# Patient Record
Sex: MALE | Race: WHITE | NOT HISPANIC OR LATINO | Employment: FULL TIME | URBAN - METROPOLITAN AREA
[De-identification: names, ages, dates, MRNs, and addresses within clinical notes are randomized per-mention and may not be internally consistent; named-entity substitution may affect disease eponyms.]

---

## 2022-09-27 PROBLEM — M94.261 CHONDROMALACIA OF RIGHT KNEE: Status: ACTIVE | Noted: 2022-09-27

## 2022-10-24 PROBLEM — S83.232A COMPLEX TEAR OF MEDIAL MENISCUS OF LEFT KNEE AS CURRENT INJURY, INITIAL ENCOUNTER: Status: ACTIVE | Noted: 2022-10-24

## 2023-12-06 ENCOUNTER — HOSPITAL ENCOUNTER (OUTPATIENT)
Dept: RADIOLOGY | Facility: MEDICAL CENTER | Age: 43
End: 2023-12-06
Attending: PHYSICIAN ASSISTANT
Payer: COMMERCIAL

## 2023-12-06 ENCOUNTER — OCCUPATIONAL MEDICINE (OUTPATIENT)
Dept: URGENT CARE | Facility: PHYSICIAN GROUP | Age: 43
End: 2023-12-06
Payer: COMMERCIAL

## 2023-12-06 VITALS
DIASTOLIC BLOOD PRESSURE: 70 MMHG | RESPIRATION RATE: 18 BRPM | BODY MASS INDEX: 28.1 KG/M2 | SYSTOLIC BLOOD PRESSURE: 124 MMHG | HEIGHT: 77 IN | WEIGHT: 238 LBS | HEART RATE: 74 BPM | TEMPERATURE: 97.9 F | OXYGEN SATURATION: 99 %

## 2023-12-06 DIAGNOSIS — S86.911A STRAIN OF RIGHT KNEE, INITIAL ENCOUNTER: ICD-10-CM

## 2023-12-06 PROCEDURE — 99203 OFFICE O/P NEW LOW 30 MIN: CPT | Mod: 29 | Performed by: PHYSICIAN ASSISTANT

## 2023-12-06 PROCEDURE — 3074F SYST BP LT 130 MM HG: CPT | Performed by: PHYSICIAN ASSISTANT

## 2023-12-06 PROCEDURE — 3078F DIAST BP <80 MM HG: CPT | Performed by: PHYSICIAN ASSISTANT

## 2023-12-06 PROCEDURE — 73562 X-RAY EXAM OF KNEE 3: CPT | Mod: RT

## 2023-12-06 NOTE — LETTER
"    EMPLOYEE’S CLAIM FOR COMPENSATION/ REPORT OF INITIAL TREATMENT  FORM C-4  PLEASE TYPE OR PRINT    EMPLOYEE’S CLAIM - PROVIDE ALL INFORMATION REQUESTED   First Name                    LUDA Mullen Last Name  Reese Birthdate                    1980                Sex  []M  []F Claim Number (Insurer’s Use Only)     Home Address  [address]  Age  43 y.o. Height  1.956 m (6' 5\") Weight  108 kg (238 lb) Social Security Number     [facility] Zip  [address] Telephone  [phone] (home)    Mailing Address  [address]  [place] Zip  [address] Primary Language Spoken  English    INSURER  Baron Chu   THIRD-PARTY   Baron Chu   Employee's Occupation (Job Title) When Injury or Occupational Disease Occurred      Employer's Name/Company Name  [employer]  Telephone  [phone]    Office Mail Address (Number and Street)  [address] [place] [address]     Date of Injury (if applicable) 12/5/2023               Hours Injury (if applicable)            am               pm Date Employer Notified  12/5/2023 Last Day of Work after Injury or Occupational Disease  12/5/2023 Supervisor to Whom Injury     Reported  Ronak Mason   Address or Location of Accident (if applicable)  Work [1]   What were you doing at the time of accident? (if applicable)  stepped into cab of package car    How did this injury or occupational disease occur? (Be specific and answer in detail. Use additional sheet if necessary)  stepped into, cab from cargo area, right leg slipped down from top step to bottom step (2 steps total) and was straight, jamming my knee   If you believe that you have an occupational disease, when did you first have knowledge of the disability and its relationship to your employment?  N/A Witnesses to the Accident (if applicable)  N/A      Nature of Injury or Occupational " Disease  Workers' Compensation  Part(s) of Body Injured or Affected  Knee (R) N/A N/A    I CERTIFY THAT THE ABOVE IS TRUE AND CORRECT TO T HE BEST OF MY KNOWLEDGE AND THAT I HAVE PROVIDED THIS INFORMATION IN ORDER TO OBTAIN THE BENEFITS OF [place], OR [place]).  I HEREBY AUTHORIZE ANY PHYSICIAN, CHIROPRACTOR, SURGEON, PRACTITIONER OR ANY OTHER PERSON, ANY HOSPITAL, INCLUDING [facility], ANY  MEDICAL SERVICE ORGANIZATION, ANY INSURANCE COMPANY, OR OTHER INSTITUTION OR ORGANIZATION TO RELEASE TO EACH OTHER, ANY MEDICAL OR OTHER INFORMATION, INCLUDING BENEFITS PAID OR PAYABLE, PERTINENT TO THIS INJURY OR DISEASE, EXCEPT INFORMATION RELATIVE TO DIAGNOSIS, TREATMENT AND/OR COUNSELING FOR AIDS, PSYCHOLOGICAL CONDITIONS, ALCOHOL OR CONTROLLED SUBSTANCES, FOR WHICH I MUST GIVE SPECIFIC AUTHORIZATION.  A PHOTOSTAT OF THIS AUTHORIZATION SHALL BE VALID AS THE ORIGINAL.     Date   Place Employee’s Original or  *Electronic Signature   THIS REPORT MUST BE COMPLETED AND MAILED WITHIN 3 WORKING DAYS OF TREATMENT   Place  [facility]    Name of Facility  [facility]   Date 12/6/2023 Diagnosis and Description of Injury or Occupational Disease  (S86.911A) Strain of right knee, initial encounter  The encounter diagnosis was Strain of right knee, initial encounter. Is there evidence that the injured employee was under the influence of alcohol and/or another controlled substance at the time of accident?  []No  [] Yes (if yes, please explain)   Hour 10:40 AM  No   Treatment: Evaluation with radiographs, use of over-the-counter anti-inflammatories ice and elevation.  Application of a hinged knee brace and appropriate use thereof    Have you advised the patient to remain off work five days or more?   [] Yes Indicate dates: From   To    []No If no, is the injured employee capable of: [] full duty []  modified duty                                                             No  Yes  If modified duty, specify any limitations / restrictions:  Light duty, see associated paperwork                                                                                                                                                                                                                                                                                                                                                                                                               X-Ray Findings:   Comments:IMPRESSION:   1.  There is a moderate joint effusion. 2.  Questionable transverse linear lucency in the lateral tibial plateau seen on only one view, possibly a nondisplaced tibial plateau fracture. If pain persists, CT or MRI may be of benefit.    From information given by the employee, together with medical evidence, can you directly connect this injury or occupational disease as job incurred?  []Yes   [] No Yes    Is additional medical care by a physician indicated? []Yes [] No  Yes    Do you know of any previous injury or disease contributing to this condition or occupational disease? []Yes [] No (Explain if yes)                          Yes  Comments:Potentially contributing as prior meniscus injury with meniscus surgery in early 2023   Date  12/6/2023 Print Health Care Provider’s Name  Ap Mathis P.A.-C. I certify that the employer’s copy of  this form was delivered to the employer on:   Address  [address] [place] [address]. INSURER'S USE ONLY                       [facility]  [id number] Provider’s Tax ID Number  [id number]   Telephone  Dept: [phone]    Health Care Provider’s Original or Electronic Signature  e-AP Pereira P.A.-C. Degree (MD,, DC,PAMaria DoloresC,APRN)  LORA  Choose (if applicable)      ORIGINAL - TREATING HEALTHCARE PROVIDER PAGE 2 - INSURER/TPA PAGE 3 - EMPLOYER PAGE 4 - EMPLOYEE      "        Form C-4 (rev.08/23)        BRIEF DESCRIPTION OF RIGHTS AND BENEFITS  (Pursuant to [place] 616[place].050)    Notice of Injury or Occupational Disease (Incident Report Form C-1): If an injury or occupational disease (OD) arises out of and in the course of employment, you must provide written notice to your employer as soon as practicable, but no later than 7 days after the accident or OD. Your employer shall maintain a sufficient supply of the required forms.    Claim for Compensation (Form C-4): If medical treatment is sought, the form C-4 is available at the place of initial treatment. A completed \"Claim for Compensation\" (Form C-4) must be filed within 90 days after an accident or OD. The treating physician or chiropractor must, within 3 working days after treatment, complete and mail to the employer, the employer's insurer and third-party , the Claim for Compensation.    Medical Treatment: If you require medical treatment for your on-the-job injury or OD, you may be required to select a physician or chiropractor from a list provided by your workers’ compensation insurer, if it has contracted with an Organization for Managed Care (MCO) or Preferred Provider Organization (PPO) or providers of health care. If your employer has not entered into a contract with an MCO or PPO, you may select a physician or chiropractor from the Panel of Physicians and Chiropractors. Any medical costs related to your industrial injury or OD will be paid by your insurer.    Temporary Total Disability (TTD): If your doctor has certified that you are unable to work for a period of at least 5 consecutive days, or 5 cumulative days in a 20-day period, or places restrictions on you that your employer does not accommodate, you may be entitled to TTD compensation.    Temporary Partial Disability (TPD): If the wage you receive upon reemployment is less than the compensation for TTD to which you are entitled, the insurer may be " required to pay you TPD compensation to make up the difference. TPD can only be paid for a maximum of 24 months.    Permanent Partial Disability (PPD): When your medical condition is stable and there is an indication of a PPD as a result of your injury or OD, within 30 days, your insurer must arrange for an evaluation by a rating physician or chiropractor to determine the degree of your PPD. The amount of your PPD award depends on the date of injury, the results of the PPD evaluation, your age and wage.    Permanent Total Disability (PTD): If you are medically certified by a treating physician or chiropractor as permanently and totally disabled and have been granted a PTD status by your insurer, you are entitled to receive monthly benefits not to exceed 66 2/3% of your average monthly wage. The amount of your PTD payments is subject to reduction if you previously received a lump-sum PPD award.    Vocational Rehabilitation Services: You may be eligible for vocational rehabilitation services if you are unable to return to the job due to a permanent physical impairment or permanent restrictions as a result of your injury or occupational disease.    Transportation and Per Jessica Reimbursement: You may be eligible for travel expenses and per jessica associated with medical treatment.    Reopening: You may be able to reopen your claim if your condition worsens after claim closure.     Appeal Process: If you disagree with a written determination issued by the insurer or the insurer does not respond to your request, you may appeal to the Department of Administration, , by following the instructions contained in your determination letter. You must appeal the determination within 70 days from the date of the determination letter at [address], [address], [place], [place] [address], or [address], [address], [place] [address]. If you disagree with the  decision, you may  appeal to the Department of Administration, . You must file your appeal within 30 days from the date of the  decision letter at [address], [address], [place], [place] [address], or [address], [place], [place] [address]. If you disagree with a decision of an , you may file a petition for judicial review with the District Court. You must do so within 30 days of the Appeal Officer’s decision. You may be represented by an  at your own expense or you may contact the [facility] for possible representation.    [place]  for Injured Workers (NAIW): If you disagree with a  decision, you may request that NAIW represent you without charge at an  Hearing. For information regarding denial of benefits, you may contact the [facility] at: [address], [address], [place] [address], [phone], or [address], [place] [address], [phone]    To File a Complaint with the Division: If you wish to file a complaint with the  of the Division of Industrial Relations (DIR),  please contact the Workers’ Compensation Section, [address], [address], [place], [place] [address], telephone [phone]) [phone], or [address], [place], [place] [address], telephone [phone].    For assistance with Workers’ Compensation Issues: You may contact the [facility] Office for Consumer Health Assistance, [address], [place] [address], Toll Free 1-172.567.1477, Web site: http://[facility].[place].gov/Programs/VANNESA E-mail: vannesa@[facility].[place].gov              __________________________________________________________________                                    _________________            Employee Name / Signature                                                                                                                            Date                                                                                                                                                                                                                               D-2 (rev. 10/20)

## 2023-12-06 NOTE — LETTER
[facility]  [address] [place] [address]  Phone:  [phone] - Fax:  [phone]   [facility] Progress Report and Disability Certification  Date of Service: 12/6/2023   No Show:  No  Date / Time of Next Visit: 12/8/2023   Claim Information   Patient Name: Sebas Leigh  Claim Number:     Employer: [employer]  Date of Injury: 12/5/2023     Insurer / TPA: [employer] [place]  ID / SSN:     Occupation:   Diagnosis: The encounter diagnosis was Strain of right knee, initial encounter.    Medical Information   Related to Industrial Injury? Yes    Subjective Complaints:  The remainder 12/5/2023: Patient describes that he was on steps leading from the cargo area of the van into the cab when he slipped causing his right foot missed 2 steps and landed with his knee straight.  He felt immediate pain, pain was worse over the lateral knee.  Today he is able to walk with no significant limp but notes his diffuse knee pain and swelling, pain is more prominent with shooting pain over the lateral aspect of the knee.  He has not noted any instability.  Prior history of meniscal tear in that knee with surgical repair earlier this year, reports that he was at a fully recovered state prior to his injury and is not noting any symptoms similar to what he had with his prior meniscus injury.  He denies any numbness or tingling.  He has no second job   Objective Findings: Alert nontoxic male no acute distress.  Effusion of the right knee with ballottement of the patella.  No joint line tenderness.  No laxity with varus and valgus stress, negative anterior posterior drawer.  Ambulatory with a steady station and gait, neurovascularly intact, no appreciated ligamentous instability   Pre-Existing Condition(s):     Assessment:   Initial Visit    Status: Additional Care Required  Permanent Disability:No    Plan:      Diagnostics: X-ray    Comments:  IMPRESSION:  1.  There is a  moderate joint effusion.  2.  Questionable transverse linear lucency in the lateral tibial plateau seen on only one view, possibly a nondisplaced tibial plateau fracture. If pain persists, CT or MRI may be of benefit.      Disability Information   Status: Released to Restricted Duty    From:  2023  Through: 2023 Restrictions are: Temporary   Physical Restrictions   Sitting:    Standing:  < or = to 1 hr/day Stooping:    Bendin hrs/day   Squattin hrs/day Walking:  < or = to 2 hrs/day Climbin hrs/day Pushin hrs/day   Pulling:    Other:    Reaching Above Shoulder (L):   Reaching Above Shoulder (R):       Reaching Below Shoulder (L):    Reaching Below Shoulder (R):      Not to exceed Weight Limits   Carrying(hrs):   Weight Limit(lb): < or = to 10 pounds Lifting(hrs):   Weight  Limit(lb): < or = to 10 pounds   Comments: Recommend light duty and activity as tolerated.  Patient may not be able to ascend and descend into his vehicle or lift heavy weight depending on his pain pattern.  Recommend he remain in the hinged knee brace, ice and elevate the knee regularly and return in 2 days for repeat evaluation.  If not showing improvement I would have a low threshold to refer him to orthopedics given his prior knee injury and history    Repetitive Actions   Hands: i.e. Fine Manipulations from Grasping:     Feet: i.e. Operating Foot Controls:     Driving / Operate Machinery:     Health Care Provider’s Original or Electronic Signature  Ap Mathis P.A.-C. Health Care Provider’s Original or Electronic Signature    Paresh Franco DO MPH     Clinic Name / Location: [facility] [address] [address] Clinic Phone Number: Dept: [phone]   Appointment Time: 10:05 Am Visit Start Time: 10:40 AM   Check-In Time:  10:31 Am Visit Discharge Time:  12:20 AM    Original-Treating Physician or Chiropractor    Page 2-Insurer/TPA    Page 3-Employer    Page 4-Employee

## 2023-12-06 NOTE — PROGRESS NOTES
"Subjective:     Sebas Leigh is a 43 y.o. male who presents for Knee Injury (New  Right knee)      The remainder 12/5/2023: Patient describes that he was on steps leading from the cargo area of the van into the cab when he slipped causing his right foot missed 2 steps and landed with his knee straight.  He felt immediate pain, pain was worse over the lateral knee.  Today he is able to walk with no significant limp but notes his diffuse knee pain and swelling, pain is more prominent with shooting pain over the lateral aspect of the knee.  He has not noted any instability.  Prior history of meniscal tear in that knee with surgical repair earlier this year, reports that he was at a fully recovered state prior to his injury and is not noting any symptoms similar to what he had with his prior meniscus injury.  He denies any numbness or tingling.  He has no second job    PMH:   No pertinent past medical history to this problem  MEDS:  Medications were reviewed in EMR  ALLERGIES:  Allergies were reviewed in EMR  SOCHX:  Works as a truck/  FH:   No pertinent family history to this problem       Objective:     /70   Pulse 74   Temp 36.6 °C (97.9 °F)   Resp 18   Ht 1.956 m (6' 5\")   Wt 108 kg (238 lb)   SpO2 99%   BMI 28.22 kg/m²     Alert nontoxic male no acute distress.  Effusion of the right knee with ballottement of the patella.  No joint line tenderness.  No laxity with varus and valgus stress, negative anterior posterior drawer.  Ambulatory with a steady station and gait, neurovascularly intact, no appreciated ligamentous instability    RADIOLOGY RESULTS   DX-KNEE 3 VIEWS RIGHT    Result Date: 12/6/2023 12/6/2023 11:17 AM HISTORY/REASON FOR EXAM:  Right lateral knee pain after fall yesterday. TECHNIQUE/EXAM DESCRIPTION AND NUMBER OF VIEWS:  3 views of the RIGHT knee. COMPARISON: None FINDINGS: There is a moderate joint effusion. There is no evidence of displaced  fracture or dislocation. " There is mild degenerative spurring in all 3 compartments. Questionable transverse linear lucency in the lateral tibial plateau on one view not confirmed on other views.     1.  There is a moderate joint effusion. 2.  Questionable transverse linear lucency in the lateral tibial plateau seen on only one view, possibly a nondisplaced tibial plateau fracture. If pain persists, CT or MRI may be of benefit.             Assessment/Plan:       1. Strain of right knee, initial encounter  - DX-KNEE 3 VIEWS Pain/Deformity Following Trauma; Future    Released to Restricted Duty FROM 12/6/2023 TO 12/11/2023  Recommend light duty and activity as tolerated.  Patient may not be able to ascend and descend into his vehicle or lift heavy weight depending on his pain pattern.  Recommend he remain in the hinged knee brace, ice and elevate the knee regularly and return in 5 days for repeat evaluation.  If not showing improvement I would have a low threshold to refer him to orthopedics given his prior knee injury and history       Differential diagnosis, natural history, supportive care, and indications for immediate follow-up discussed.

## 2023-12-08 ENCOUNTER — OCCUPATIONAL MEDICINE (OUTPATIENT)
Dept: URGENT CARE | Facility: PHYSICIAN GROUP | Age: 43
End: 2023-12-08
Payer: COMMERCIAL

## 2023-12-08 VITALS
DIASTOLIC BLOOD PRESSURE: 92 MMHG | HEIGHT: 77 IN | WEIGHT: 235 LBS | BODY MASS INDEX: 27.75 KG/M2 | OXYGEN SATURATION: 99 % | RESPIRATION RATE: 14 BRPM | SYSTOLIC BLOOD PRESSURE: 136 MMHG | HEART RATE: 69 BPM | TEMPERATURE: 98.5 F
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
## 2023-12-08 DIAGNOSIS — S86.911D STRAIN OF RIGHT KNEE, SUBSEQUENT ENCOUNTER: ICD-10-CM

## 2023-12-08 PROCEDURE — 3075F SYST BP GE 130 - 139MM HG: CPT

## 2023-12-08 PROCEDURE — 99214 OFFICE O/P EST MOD 30 MIN: CPT

## 2023-12-08 PROCEDURE — 3080F DIAST BP >= 90 MM HG: CPT

## 2023-12-08 NOTE — PROGRESS NOTES
"Subjective:     Sebas Leigh is a 43 y.o. male who presents for Follow-Up (W/C DOI 12.5.2023/Right knee, sharp pains x 1 day/Swelling/)      DOI: 12/5/2023: Patient describes that he was on steps leading from the cargo area of the van into the cab when he slipped causing his right foot missed 2 steps and landed with his knee straight.  He felt immediate pain, pain was worse over the lateral knee.  Today he is able to walk with no significant limp but notes his diffuse knee pain and swelling, pain is more prominent with shooting pain over the lateral aspect of the knee.  He has not noted any instability.  Prior history of meniscal tear in that knee with surgical repair earlier this year, reports that he was at a fully recovered state prior to his injury and is not noting any symptoms similar to what he had with his prior meniscus injury.  He denies any numbness or tingling.  He has no second job    FV#1 12/8/23.  Pt reports that the swelling has not improved and the pain is about the same.  He now has a sharp pain right below his knee cap that is intermittent.  He reports that it feel slightly unstable at times.  Sharp pain to lateral joint line with every step and when he tries to walk up and down stairs and if he stands for too long.  The swelling gets better if he stays off his leg but if he takes the brace off his knee just \"blows up\" with swelling    PMH:   No pertinent past medical history to this problem  MEDS:  Medications were reviewed in EMR  ALLERGIES:  Allergies were reviewed in EMR  FH:   No pertinent family history to this problem       Objective:     BP (!) 136/92   Pulse 69   Temp 36.9 °C (98.5 °F) (Temporal)   Resp 14   Ht 1.956 m (6' 5\")   Wt 107 kg (235 lb)   SpO2 99%   BMI 27.87 kg/m²     Gen: no acute distress, normal voice  Skin: dry, intact, moist mucosal membranes  Head: Atraumatic, normocephalic  Psych: normal affect, normal judgement, alert, awake  Musculoskeletal: Moderate amount of " swelling noted.  Pain along lateral joint line.  TTP to area just distal of knee cap.          Assessment/Plan:       1. Work related injury  - Referral to Orthopedics  - Referral to Occupational Medicine  - MR-KNEE-W/O RIGHT; Future  - Referral to Radiology    2. Strain of right knee, subsequent encounter  - Referral to Orthopedics  - Referral to Occupational Medicine  - MR-KNEE-W/O RIGHT; Future  - Referral to Radiology    Released to Restricted Duty FROM 12/8/2023 TO 12/15/2023  Recommend light duty and activity as tolerated. Recommend desk work.  Patient may not be able to ascend and descend into his vehicle   Recommend he remain in the hinged knee brace, ice and elevate the knee regularly.  Limit 10 lb, no stooping, squatting, climbing.  Can walk or stand for up to 1 hour per day.  Fu in 1 week with Occupation medicine for MRI results and further management. Referral placed   On original xray there was question of a possible nondisplaced tibial plateau fracture, pt is now having pain in that area, recommended further imaging.  MRI was placed due to pt's history of surgical repair and new pain.    Differential diagnosis, natural history, supportive care, and indications for immediate follow-up discussed.

## 2023-12-08 NOTE — LETTER
"   [facility]  [address]  [place] [address]  Phone:  [phone] - Fax:  [phone]   [facility] Progress Report and Disability Certification  Date of Service: 12/8/2023   No Show:  No  Date / Time of Next Visit: 12/18/2023   Claim Information   Patient Name: Sebas Leigh  Claim Number:     Employer: [employer]  Date of Injury: 12/5/2023     Insurer / TPA: Baron [place]  ID / SSN:     Occupation:   Diagnosis: Diagnoses of Work related injury and Strain of right knee, subsequent encounter were pertinent to this visit.    Medical Information   Related to Industrial Injury?      Subjective Complaints:  DOI: 12/5/2023: Patient describes that he was on steps leading from the cargo area of the van into the cab when he slipped causing his right foot missed 2 steps and landed with his knee straight.  He felt immediate pain, pain was worse over the lateral knee.  Today he is able to walk with no significant limp but notes his diffuse knee pain and swelling, pain is more prominent with shooting pain over the lateral aspect of the knee.  He has not noted any instability.  Prior history of meniscal tear in that knee with surgical repair earlier this year, reports that he was at a fully recovered state prior to his injury and is not noting any symptoms similar to what he had with his prior meniscus injury.  He denies any numbness or tingling.  He has no second job    FV#1 12/8/23.  Pt reports that the swelling has not improved and the pain is about the same.  He now has a sharp pain right below his knee cap that is intermittent.  He reports that it feel slightly unstable at times.  Sharp pain to lateral joint line with every step and when he tries to walk up and down stairs and if he stands for too long.  The swelling gets better if he stays off his leg but if he takes the brace off his knee just \"blows up\" with swelling   Objective Findings: Gen: no " acute distress, normal voice  Skin: dry, intact, moist mucosal membranes  Head: Atraumatic, normocephalic  Psych: normal affect, normal judgement, alert, awake  Musculoskeletal: Moderate amount of swelling noted.  Pain along lateral joint line.  TTP to area just distal of knee cap.         Pre-Existing Condition(s):     Assessment:   Condition Worsened    Status: Discharged / Care Transfer  Permanent Disability:     Plan:      Diagnostics: MRI    Comments:       Disability Information   Status: Released to Restricted Duty    From:  2023  Through: 2023 Restrictions are: Temporary   Physical Restrictions   Sitting:    Standing:  < or = to 1 hr/day Stoopin hrs/day Bending:      Squattin hrs/day Walking:  < or = to 1 hr/day Climbin hrs/day Pushing:      Pulling:    Other:    Reaching Above Shoulder (L):   Reaching Above Shoulder (R):       Reaching Below Shoulder (L):    Reaching Below Shoulder (R):      Not to exceed Weight Limits   Carrying(hrs):   Weight Limit(lb): < or = to 10 pounds Lifting(hrs):   Weight  Limit(lb):     Comments: Recommend desk work, pt must remain non-weight bearing and use crutches at all times.  Recommend he remain in the knee brace, ice and elevate the knee regularly.  Limit 10 lb, no stooping, squatting, climbing.  Limit standing and walking    Repetitive Actions   Hands: i.e. Fine Manipulations from Grasping:     Feet: i.e. Operating Foot Controls:     Driving / Operate Machinery:     Health Care Provider’s Original or Electronic Signature  BARBARA Motley Health Care Provider’s Original or Electronic Signature    Paresh Franco DO MPH     Clinic Name / Location: [facility] [address] [address] Clinic Phone Number: Dept: [phone]   Appointment Time: 11:00 Am Visit Start Time: 10:55 AM   Check-In Time:  10:46 Am Visit Discharge Time: 11:43 Am    Original-Treating Physician or Chiropractor    Page 2-Insurer/TPA    Page  3-Employer    Page 4-Employee

## 2023-12-08 NOTE — LETTER
"   [facility] [place]  [address]  [place] [address]  Phone:  [phone] - Fax:  [phone]   [facility] Progress Report and Disability Certification  Date of Service: 12/8/2023   No Show:  No  Date / Time of Next Visit: 12/15/2023   Claim Information   Patient Name: Sebas Leigh  Claim Number:     Employer: [employer]  Date of Injury: 12/5/2023     Insurer / TPA: Baron [place]  ID / SSN:     Occupation:   Diagnosis: Diagnoses of Work related injury and Strain of right knee, subsequent encounter were pertinent to this visit.    Medical Information   Related to Industrial Injury?      Subjective Complaints:  DOI: 12/5/2023: Patient describes that he was on steps leading from the cargo area of the van into the cab when he slipped causing his right foot missed 2 steps and landed with his knee straight.  He felt immediate pain, pain was worse over the lateral knee.  Today he is able to walk with no significant limp but notes his diffuse knee pain and swelling, pain is more prominent with shooting pain over the lateral aspect of the knee.  He has not noted any instability.  Prior history of meniscal tear in that knee with surgical repair earlier this year, reports that he was at a fully recovered state prior to his injury and is not noting any symptoms similar to what he had with his prior meniscus injury.  He denies any numbness or tingling.  He has no second job    FV#1 12/8/23.  Pt reports that the swelling has not improved and the pain is about the same.  He now has a sharp pain right below his knee cap that is intermittent.  He reports that it feel slightly unstable at times.  Sharp pain to lateral joint line with every step and when he tries to walk up and down stairs and if he stands for too long.  The swelling gets better if he stays off his leg but if he takes the brace off his knee just \"blows up\" with swelling   Objective Findings: Gen: no " acute distress, normal voice  Skin: dry, intact, moist mucosal membranes  Head: Atraumatic, normocephalic  Psych: normal affect, normal judgement, alert, awake  Musculoskeletal: Moderate amount of swelling noted.  Pain along lateral joint line.  TTP to area just distal of knee cap.         Pre-Existing Condition(s):     Assessment:   Condition Worsened    Status: Discharged / Care Transfer  Permanent Disability:     Plan:      Diagnostics: MRI    Comments:       Disability Information   Status: Released to Restricted Duty    From:  2023  Through: 12/15/2023 Restrictions are: Temporary   Physical Restrictions   Sitting:    Standing:  < or = to 1 hr/day Stoopin hrs/day Bending:      Squattin hrs/day Walking:  < or = to 1 hr/day Climbin hrs/day Pushing:      Pulling:    Other:    Reaching Above Shoulder (L):   Reaching Above Shoulder (R):       Reaching Below Shoulder (L):    Reaching Below Shoulder (R):      Not to exceed Weight Limits   Carrying(hrs):   Weight Limit(lb): < or = to 10 pounds Lifting(hrs):   Weight  Limit(lb):     Comments: Recommend light duty and activity as tolerated. Recommend desk work.  Patient may not be able to ascend and descend into his vehicle   Recommend he remain in the hinged knee brace, ice and elevate the knee regularly.  Limit 10 lb, no stooping, squatting, climbing.  Can walk or stand for up to 1 hour per day.  Fu in 1 week with Occupation medicine for MRI results and further management. Referral placed    Repetitive Actions   Hands: i.e. Fine Manipulations from Grasping:     Feet: i.e. Operating Foot Controls:     Driving / Operate Machinery:     Health Care Provider’s Original or Electronic Signature  BARBARA Motley Health Care Provider’s Original or Electronic Signature    Paresh Franco DO MPH     Clinic Name / Location: [facility] [address] [address] Clinic Phone Number: Dept: [phone]   Appointment Time: 11:00 Am  Visit Start Time: 10:55 AM   Check-In Time:  10:46 Am Visit Discharge Time:  11:45 AM   Original-Treating Physician or Chiropractor    Page 2-Insurer/TPA    Page 3-Employer    Page 4-Employee

## 2023-12-11 ENCOUNTER — APPOINTMENT (OUTPATIENT)
Dept: RADIOLOGY | Facility: MEDICAL CENTER | Age: 43
End: 2023-12-11
Payer: COMMERCIAL

## 2023-12-11 DIAGNOSIS — S82.101A CLOSED FRACTURE OF PROXIMAL END OF RIGHT TIBIA, UNSPECIFIED FRACTURE MORPHOLOGY, INITIAL ENCOUNTER: ICD-10-CM
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
## 2023-12-11 DIAGNOSIS — S86.911D STRAIN OF RIGHT KNEE, SUBSEQUENT ENCOUNTER: ICD-10-CM

## 2023-12-11 PROCEDURE — 73721 MRI JNT OF LWR EXTRE W/O DYE: CPT | Mod: RT

## 2023-12-11 NOTE — RESULT ENCOUNTER NOTE
MRI results show tibial fracture, recommend non-weight bearing.  Work restrictions changed to reflect this new dx, faxed to employer and emailed to pt.  Pt was half way back home to [facility] when results came in, fortunately  he has crutches at home he can use

## 2023-12-18 ENCOUNTER — OCCUPATIONAL MEDICINE (OUTPATIENT)
Dept: OCCUPATIONAL MEDICINE | Facility: CLINIC | Age: 43
End: 2023-12-18
Payer: COMMERCIAL

## 2023-12-18 VITALS
RESPIRATION RATE: 16 BRPM | HEIGHT: 77 IN | OXYGEN SATURATION: 97 % | TEMPERATURE: 99.5 F | HEART RATE: 80 BPM | WEIGHT: 235 LBS | DIASTOLIC BLOOD PRESSURE: 90 MMHG | SYSTOLIC BLOOD PRESSURE: 130 MMHG | BODY MASS INDEX: 27.75 KG/M2

## 2023-12-18 DIAGNOSIS — S82.141D CLOSED FRACTURE OF RIGHT TIBIAL PLATEAU WITH ROUTINE HEALING, SUBSEQUENT ENCOUNTER: ICD-10-CM

## 2023-12-18 DIAGNOSIS — S86.911D KNEE STRAIN, RIGHT, SUBSEQUENT ENCOUNTER: ICD-10-CM

## 2023-12-18 PROCEDURE — 3080F DIAST BP >= 90 MM HG: CPT | Performed by: PREVENTIVE MEDICINE

## 2023-12-18 PROCEDURE — 99203 OFFICE O/P NEW LOW 30 MIN: CPT | Performed by: PREVENTIVE MEDICINE

## 2023-12-18 PROCEDURE — 3075F SYST BP GE 130 - 139MM HG: CPT | Performed by: PREVENTIVE MEDICINE

## 2023-12-18 NOTE — LETTER
[address],   [address] [place] [address]  Phone:  [phone] - Fax:  [phone]   [facility] Progress Report and Disability Certification  Date of Service: 12/18/2023   No Show:  No  Date / Time of Next Visit: 1/8/2024 @ 11:15 AM   Claim Information   Patient Name: Sebas Leigh  Claim Number:     Employer: [employer]  Date of Injury: 12/5/2023     Insurer / TPA: Baron [place]  ID / SSN:     Occupation:   Diagnosis: Diagnoses of Knee strain, right, subsequent encounter and Closed fracture of right tibial plateau with routine healing, subsequent encounter were pertinent to this visit.    Medical Information   Related to Industrial Injury? Yes    Subjective Complaints:  DOI: 12/5/2023: Patient describes that he was on steps leading from the cargo area of the van into the cab when he slipped causing his right foot missed 2 steps and landed with his knee straight.  He felt immediate pain, pain was worse over the lateral knee. Has a history of right knee medial meniscus tear from Jan 2022, had surgery October 2022.    Patient states that overall knee pain is most the same.  Pain is mostly anterior and lateral.  He states that he got a call from the nurse practitioner told that he had a tibial plateau fracture and was told to use crutches.  He states that since doing so he is at low but less pain.  He states he still waiting approval for the orthopedic appointment.   Objective Findings: Right Knee: No gross deformity.  Tenderness palpation diffusely throughout the anterior knee including the proximal tibia and lateral knee diffusely    MRI Right KNee:  Marrow edema with mild impaction fracture the proximal tibia posterolaterally. There is fracture component extending through the articular cartilage posteriorly. No significant articular surface separation.     2.  Full-thickness fissuring and irregularity of the patellar  cartilage centrally extending into the medial and lateral facets as well as involving the trochlear groove centrally.     3.  Fissuring of irregularity proximal tibial cartilage medially.     4.  Mild sprain of the anterior cruciate, medial collateral and fibular collateral ligaments.     5.  Large joint effusion with fat/fluid level.     6.  Small popliteal cyst.     Pre-Existing Condition(s):     Assessment:   Condition Same    Status: Additional Care Required  Permanent Disability:No    Plan:      Diagnostics:      Comments:  Referral to orthopedics, pending approval  Continue in hinged knee brace  Continue to use crutches  OTC ibuprofen/Tylenol as needed  Restricted duty  Follow-up 3 weeks if not seen by orthopedics    Disability Information   Status: Released to Restricted Duty    From:  2023  Through: 2024 Restrictions are: Temporary   Physical Restrictions   Sitting:    Standing:  < or = to 1 hr/day Stooping:    Bending:      Squattin hrs/day Walking:  < or = to 1 hr/day Climbin hrs/day Pushing:      Pulling:    Other:    Reaching Above Shoulder (L):   Reaching Above Shoulder (R):       Reaching Below Shoulder (L):    Reaching Below Shoulder (R):      Not to exceed Weight Limits   Carrying(hrs):   Weight Limit(lb): < or = to 10 pounds Lifting(hrs):   Weight  Limit(lb): < or = to 10 pounds   Comments: Office/Sedentary work only.  Must use crutches at work    Repetitive Actions   Hands: i.e. Fine Manipulations from Grasping:     Feet: i.e. Operating Foot Controls:     Driving / Operate Machinery:     Health Care Provider’s Original or Electronic Signature  Paresh Franco D.O. Health Care Provider’s Original or Electronic Signature    Paresh Franco DO MPH     Clinic Name / Location: [address],   [address]  [place] [address] Clinic Phone Number: Dept: [phone]   Appointment Time: 1:00 Pm Visit Start Time: 1:14 PM   Check-In Time:  12:33 Pm Visit  Discharge Time:  1:42 PM   Original-Treating Physician or Chiropractor    Page 2-Insurer/TPA    Page 3-Employer    Page 4-Employee

## 2023-12-18 NOTE — PROGRESS NOTES
"Subjective:     Sebas Leigh is a 43 y.o. male who presents for Follow-Up ( Follow Up)      DOI: 12/5/2023: Patient describes that he was on steps leading from the cargo area of the van into the cab when he slipped causing his right foot missed 2 steps and landed with his knee straight.  He felt immediate pain, pain was worse over the lateral knee. Has a history of right knee medial meniscus tear from Jan 2022, had surgery October 2022.    Patient states that overall knee pain is most the same.  Pain is mostly anterior and lateral.  He states that he got a call from the nurse practitioner told that he had a tibial plateau fracture and was told to use crutches.  He states that since doing so he is at low but less pain.  He states he still waiting approval for the orthopedic appointment.    ROS: All systems were reviewed on intake form, form was reviewed and signed. See scanned documents in media. Pertinent positives and negatives included in HPI.    PMH: No pertinent past medical history to this problem  MEDS: Medications were reviewed in Epic  ALLERGIES: No Known Allergies  SOCHX: Works as a  at [employer]  FH: No pertinent family history to this problem       Objective:     BP (!) 130/90 (BP Location: Right arm, Patient Position: Sitting, BP Cuff Size: Adult)   Pulse 80   Temp 37.5 °C (99.5 °F) (Temporal)   Resp 16   Ht 1.956 m (6' 5\")   Wt 107 kg (235 lb)   SpO2 97%   BMI 27.87 kg/m²     Constitutional: Patient is in no acute distress. Appears well-developed and well-nourished.   HENT: Normocephalic and atraumatic. EOM are normal. No scleral icterus.   Cardiovascular: Normal rate.    Pulmonary/Chest: Effort normal. No respiratory distress.   Neurological: Patient is alert and oriented to person, place, and time.   Skin: Skin is warm and dry.   Psychiatric: Normal mood and affect. Behavior is normal.     Right Knee: No gross deformity.  Tenderness palpation diffusely throughout the anterior knee " including the proximal tibia and lateral knee diffusely    MRI Right KNee:  Marrow edema with mild impaction fracture the proximal tibia posterolaterally. There is fracture component extending through the articular cartilage posteriorly. No significant articular surface separation.     2.  Full-thickness fissuring and irregularity of the patellar cartilage centrally extending into the medial and lateral facets as well as involving the trochlear groove centrally.     3.  Fissuring of irregularity proximal tibial cartilage medially.     4.  Mild sprain of the anterior cruciate, medial collateral and fibular collateral ligaments.     5.  Large joint effusion with fat/fluid level.     6.  Small popliteal cyst.      Assessment/Plan:       1. Knee strain, right, subsequent encounter    2. Closed fracture of right tibial plateau with routine healing, subsequent encounter    Released to Restricted Duty FROM 12/18/2023 TO 1/8/2024  Office/Sedentary work only.  Must use crutches at work  Referral to orthopedics, pending approval  Continue in hinged knee brace  Continue to use crutches  OTC ibuprofen/Tylenol as needed  Restricted duty  Follow-up 3 weeks if not seen by orthopedics    Differential diagnosis, natural history, supportive care, and indications for immediate follow-up discussed.    Approximately 35 minutes were spent in reviewing notes, preparing for visit, obtaining history, exam and evaluation, patient counseling/education and post visit documentation/orders.

## 2024-01-08 ENCOUNTER — OCCUPATIONAL MEDICINE (OUTPATIENT)
Dept: OCCUPATIONAL MEDICINE | Facility: CLINIC | Age: 44
End: 2024-01-08
Payer: COMMERCIAL

## 2024-01-08 VITALS
DIASTOLIC BLOOD PRESSURE: 80 MMHG | BODY MASS INDEX: 28.22 KG/M2 | TEMPERATURE: 97.2 F | OXYGEN SATURATION: 98 % | HEART RATE: 82 BPM | SYSTOLIC BLOOD PRESSURE: 138 MMHG | RESPIRATION RATE: 16 BRPM | HEIGHT: 77 IN | WEIGHT: 239 LBS

## 2024-01-08 DIAGNOSIS — S82.141D CLOSED FRACTURE OF RIGHT TIBIAL PLATEAU WITH ROUTINE HEALING, SUBSEQUENT ENCOUNTER: ICD-10-CM

## 2024-01-08 DIAGNOSIS — S86.911D KNEE STRAIN, RIGHT, SUBSEQUENT ENCOUNTER: ICD-10-CM

## 2024-01-08 PROCEDURE — 99213 OFFICE O/P EST LOW 20 MIN: CPT | Performed by: PREVENTIVE MEDICINE

## 2024-01-08 PROCEDURE — 3075F SYST BP GE 130 - 139MM HG: CPT | Performed by: PREVENTIVE MEDICINE

## 2024-01-08 PROCEDURE — 3079F DIAST BP 80-89 MM HG: CPT | Performed by: PREVENTIVE MEDICINE

## 2024-01-08 NOTE — PROGRESS NOTES
"Subjective:     Sebas Leigh is a 43 y.o. male who presents for Follow-Up (RM 18)      DOI: 12/5/2023: Patient describes that he was on steps leading from the cargo area of the van into the cab when he slipped causing his right foot missed 2 steps and landed with his knee straight.  He felt immediate pain, pain was worse over the lateral knee. Has a history of right knee medial meniscus tear from Jan 2022, had surgery October 2022.    1/8/2024: Patient states that overall since have been gradually improving.  He states his pretty much no swelling at this point.  He states he does get soreness throughout the proximal tibia.  He states he has been doing some the stretches that he got from prior physical therapy for the knees.  He states he spoke to his case  and that they are going to try and get him set up at the [facility].    ROS    SOCHX: Works as a  at [employer]  FH: No pertinent family history to this problem.       Objective:     /80   Pulse 82   Temp 36.2 °C (97.2 °F)   Resp 16   Ht 1.956 m (6' 5\")   Wt 108 kg (239 lb)   SpO2 98%   BMI 28.34 kg/m²     Constitutional: Patient is in no acute distress. Appears well-developed and well-nourished.   Cardiovascular: Normal rate.    Pulmonary/Chest: Effort normal. No respiratory distress.   Neurological: Patient is alert and oriented to person, place, and time.   Skin: Skin is warm and dry.   Psychiatric: Normal mood and affect. Behavior is normal.     Right Knee: No gross deformity.  Tenderness palpation diffusely throughout the anterior knee including the proximal tibia and lateral knee diffusely     MRI Right Knee:  Marrow edema with mild impaction fracture the proximal tibia posterolaterally. There is fracture component extending through the articular cartilage posteriorly. No significant articular surface separation.     2.  Full-thickness fissuring and irregularity of the patellar cartilage centrally extending into " the medial and lateral facets as well as involving the trochlear groove centrally.     3.  Fissuring of irregularity proximal tibial cartilage medially.     4.  Mild sprain of the anterior cruciate, medial collateral and fibular collateral ligaments.     5.  Large joint effusion with fat/fluid level.     6.  Small popliteal cyst.      Assessment/Plan:       1. Knee strain, right, subsequent encounter    2. Closed fracture of right tibial plateau with routine healing, subsequent encounter    Released to Restricted Duty FROM 1/8/2024 TO 2/9/2024  Office/Sedentary work only.  Must use crutches at work     Referral to orthopedics, approved, to be scheduled by case , patient prefers [facility]  Continue in hinged knee brace  Continue to use crutches as needed   OTC ibuprofen/Tylenol as needed  Restricted duty  Follow-up 4 weeks if not seen by orthopedics      Differential diagnosis, natural history, supportive care, and indications for immediate follow-up discussed.    Approximately 25 minutes was spent in preparing for visit, obtaining history, exam and evaluation, patient counseling/education and post visit documentation/orders.

## 2024-01-08 NOTE — LETTER
[address],   [place] [address]  Phone:  [phone] - Fax:  [phone]   [facility] Progress Report and Disability Certification  Date of Service: 1/8/2024   No Show:  No  Date / Time of Next Visit: 2/9/2024 11:15 AM    Claim Information   Patient Name: Sebas Leigh  Claim Number:     Employer: [employer]  Date of Injury: 12/5/2023     Insurer / TPA: Baron [place]  ID / SSN:     Occupation:   Diagnosis: Diagnoses of Knee strain, right, subsequent encounter and Closed fracture of right tibial plateau with routine healing, subsequent encounter were pertinent to this visit.    Medical Information   Related to Industrial Injury? Yes    Subjective Complaints:  DOI: 12/5/2023: Patient describes that he was on steps leading from the cargo area of the van into the cab when he slipped causing his right foot missed 2 steps and landed with his knee straight.  He felt immediate pain, pain was worse over the lateral knee. Has a history of right knee medial meniscus tear from Jan 2022, had surgery October 2022.    1/8/2024: Patient states that overall since have been gradually improving.  He states his pretty much no swelling at this point.  He states he does get soreness throughout the proximal tibia.  He states he has been doing some the stretches that he got from prior physical therapy for the knees.  He states he spoke to his case  and that they are going to try and get him set up at the [facility].   Objective Findings: Right Knee: No gross deformity.  Tenderness palpation diffusely throughout the anterior knee including the proximal tibia and lateral knee diffusely     MRI Right Knee:  Marrow edema with mild impaction fracture the proximal tibia posterolaterally. There is fracture component extending through the articular cartilage posteriorly. No significant articular surface separation.      2.  Full-thickness fissuring and irregularity of the patellar cartilage centrally extending into the medial and lateral facets as well as involving the trochlear groove centrally.     3.  Fissuring of irregularity proximal tibial cartilage medially.     4.  Mild sprain of the anterior cruciate, medial collateral and fibular collateral ligaments.     5.  Large joint effusion with fat/fluid level.     6.  Small popliteal cyst.     Pre-Existing Condition(s):     Assessment:   Condition Improved    Status: Additional Care Required  Permanent Disability:No    Plan:      Diagnostics:      Comments:  Referral to orthopedics, approved, to be scheduled by case , patient prefers [facility]  Continue in hinged knee brace  Continue to use crutches as needed   OTC ibuprofen/Tylenol as needed  Restricted duty  Follow-up 4 weeks if not seen by orthopedics      Disability Information   Status: Released to Restricted Duty    From:  2024  Through: 2024 Restrictions are: Temporary   Physical Restrictions   Sitting:    Standing:  < or = to 1 hr/day Stooping:    Bending:      Squattin hrs/day Walking:  < or = to 1 hr/day Climbin hrs/day Pushing:      Pulling:    Other:    Reaching Above Shoulder (L):   Reaching Above Shoulder (R):       Reaching Below Shoulder (L):    Reaching Below Shoulder (R):      Not to exceed Weight Limits   Carrying(hrs):   Weight Limit(lb): < or = to 10 pounds Lifting(hrs):   Weight  Limit(lb): < or = to 10 pounds   Comments: Office/Sedentary work only.  Must use crutches at work     Repetitive Actions   Hands: i.e. Fine Manipulations from Grasping:     Feet: i.e. Operating Foot Controls:     Driving / Operate Machinery:     Health Care Provider’s Original or Electronic Signature  Paresh Franco D.O. Health Care Provider’s Original or Electronic Signature    Paresh Franco DO MPH     Clinic Name / Location: [address],   [address]  [place]  [place] [address] Clinic Phone Number: Dept: [phone]   Appointment Time: 11:15 Am Visit Start Time: 10:46 AM   Check-In Time:  10:32 Am Visit Discharge Time:  11:15 AM    Original-Treating Physician or Chiropractor    Page 2-Insurer/TPA    Page 3-Employer    Page 4-Employee

## 2024-02-09 ENCOUNTER — OCCUPATIONAL MEDICINE (OUTPATIENT)
Dept: OCCUPATIONAL MEDICINE | Facility: CLINIC | Age: 44
End: 2024-02-09
Payer: COMMERCIAL

## 2024-02-09 VITALS
HEART RATE: 65 BPM | WEIGHT: 239.6 LBS | TEMPERATURE: 97.9 F | DIASTOLIC BLOOD PRESSURE: 90 MMHG | SYSTOLIC BLOOD PRESSURE: 128 MMHG | RESPIRATION RATE: 16 BRPM | OXYGEN SATURATION: 98 % | BODY MASS INDEX: 28.29 KG/M2 | HEIGHT: 77 IN

## 2024-02-09 DIAGNOSIS — S86.911D KNEE STRAIN, RIGHT, SUBSEQUENT ENCOUNTER: ICD-10-CM

## 2024-02-09 DIAGNOSIS — S82.141D CLOSED FRACTURE OF RIGHT TIBIAL PLATEAU WITH ROUTINE HEALING, SUBSEQUENT ENCOUNTER: ICD-10-CM

## 2024-02-09 PROCEDURE — 3080F DIAST BP >= 90 MM HG: CPT | Performed by: PREVENTIVE MEDICINE

## 2024-02-09 PROCEDURE — 3074F SYST BP LT 130 MM HG: CPT | Performed by: PREVENTIVE MEDICINE

## 2024-02-09 PROCEDURE — 99213 OFFICE O/P EST LOW 20 MIN: CPT | Performed by: PREVENTIVE MEDICINE

## 2024-02-09 NOTE — LETTER
[address],   [address] [place] [address]  Phone:  [phone] - Fax:  [phone]   Occupational Health [facility] Progress Report and Disability Certification  Date of Service: 2/9/2024   No Show:  No  Date / Time of Next Visit:  [place]   Claim Information   Patient Name: Sebas Leigh  Claim Number:     Employer: [employer]  Date of Injury: 12/5/2023     Insurer / TPA: Baron Assaria  ID / SSN:     Occupation:   Diagnosis: Diagnoses of Knee strain, right, subsequent encounter and Closed fracture of right tibial plateau with routine healing, subsequent encounter were pertinent to this visit.    Medical Information   Related to Industrial Injury? Yes    Subjective Complaints:  DOI: 12/5/2023: Patient describes that he was on steps leading from the cargo area of the van into the cab when he slipped causing his right foot missed 2 steps and landed with his knee straight.  He felt immediate pain, pain was worse over the lateral knee. Has a history of right knee medial meniscus tear from Jan 2022, had surgery October 2022.     1/8/2024: Patient states that overall since have been gradually improving.  He states his pretty much no swelling at this point.  He states he does get soreness throughout the proximal tibia.  He states he has been doing some the stretches that he got from prior physical therapy for the knees.  He states he spoke to his case  and that they are going to try and get him set up at the [facility].     2/9/2024: Patient states that overall symptoms have been gradually improving.  He states that he weaned himself off the crutches and is now walking completely without them.  He states that he is able to walk short distances with some mild discomfort.  He has an appoint with orthopedics in about 4 weeks.   Objective Findings: Right Knee: No gross deformity.  Tenderness palpation diffusely throughout the  anterior knee including the proximal tibia and lateral knee diffusely     MRI Right Knee:  Marrow edema with mild impaction fracture the proximal tibia posterolaterally. There is fracture component extending through the articular cartilage posteriorly. No significant articular surface separation.     2.  Full-thickness fissuring and irregularity of the patellar cartilage centrally extending into the medial and lateral facets as well as involving the trochlear groove centrally.     3.  Fissuring of irregularity proximal tibial cartilage medially.     4.  Mild sprain of the anterior cruciate, medial collateral and fibular collateral ligaments.     5.  Large joint effusion with fat/fluid level.     6.  Small popliteal cyst.     Pre-Existing Condition(s):     Assessment:   Condition Improved    Status: Discharged / Care Transfer  Permanent Disability:No    Plan:      Diagnostics:      Comments:  Keep appointment with orthopedics  Placed referral for physical therapy  Continue in hinged knee brace  Gradually increase walking as tolerated  OTC ibuprofen/Tylenol as needed  Restricted duty  Follow-up as needed      Disability Information   Status: Released to Restricted Duty    From:  2024  Through:   Restrictions are: Temporary   Physical Restrictions   Sitting:    Standing:  < or = to 1 hr/day Stooping:    Bending:      Squattin hrs/day Walking:  < or = to 1 hr/day Climbin hrs/day Pushing:      Pulling:    Other:    Reaching Above Shoulder (L):   Reaching Above Shoulder (R):       Reaching Below Shoulder (L):    Reaching Below Shoulder (R):      Not to exceed Weight Limits   Carrying(hrs):   Weight Limit(lb): < or = to 10 pounds Lifting(hrs):   Weight  Limit(lb): < or = to 10 pounds   Comments: Office/Sedentary work only. Until cleared by Orthopedics    Repetitive Actions   Hands: i.e. Fine Manipulations from Grasping:     Feet: i.e. Operating Foot Controls:     Driving / Operate Machinery:     Health Care  Provider’s Original or Electronic Signature  Paresh Franco D.O. Health Care Provider’s Original or Electronic Signature    Paresh Franco DO MPH     Clinic Name / Location: [address]  [place] [address] Clinic Phone Number: Dept: [phone]   Appointment Time: 11:15 Am Visit Start Time: 11:03 AM   Check-In Time:  10:44 Am Visit Discharge Time:  11:25AM   Original-Treating Physician or Chiropractor    Page 2-Insurer/TPA    Page 3-Employer    Page 4-Employee

## 2024-02-09 NOTE — PROGRESS NOTES
"Subjective:     Sebas Leigh is a 43 y.o. male who presents for Follow-Up ( Follow Up - Better - Rm 17)      DOI: 12/5/2023: Patient describes that he was on steps leading from the cargo area of the van into the cab when he slipped causing his right foot missed 2 steps and landed with his knee straight.  He felt immediate pain, pain was worse over the lateral knee. Has a history of right knee medial meniscus tear from Jan 2022, had surgery October 2022.     1/8/2024: Patient states that overall since have been gradually improving.  He states his pretty much no swelling at this point.  He states he does get soreness throughout the proximal tibia.  He states he has been doing some the stretches that he got from prior physical therapy for the knees.  He states he spoke to his case  and that they are going to try and get him set up at the [facility].     2/9/2024: Patient states that overall symptoms have been gradually improving.  He states that he weaned himself off the crutches and is now walking completely without them.  He states that he is able to walk short distances with some mild discomfort.  He has an appoint with orthopedics in about 4 weeks.    ROS    SOCH[employer]: Works as a  at [employer]  FH: No pertinent family history to this problem.       Objective:     BP (!) 128/90 (BP Location: Left arm, Patient Position: Sitting, BP Cuff Size: Adult)   Pulse 65   Temp 36.6 °C (97.9 °F) (Temporal)   Resp 16   Ht 1.956 m (6' 5\")   Wt 109 kg (239 lb 9.6 oz)   SpO2 98%   BMI 28.41 kg/m²     Constitutional: Patient is in no acute distress. Appears well-developed and well-nourished.   Cardiovascular: Normal rate.    Pulmonary/Chest: Effort normal. No respiratory distress.   Neurological: Patient is alert and oriented to person, place, and time.   Skin: Skin is warm and dry.   Psychiatric: Normal mood and affect. Behavior is normal.     Right Knee: No gross deformity.  Tenderness palpation " diffusely throughout the anterior knee including the proximal tibia and lateral knee diffusely     MRI Right Knee:  Marrow edema with mild impaction fracture the proximal tibia posterolaterally. There is fracture component extending through the articular cartilage posteriorly. No significant articular surface separation.     2.  Full-thickness fissuring and irregularity of the patellar cartilage centrally extending into the medial and lateral facets as well as involving the trochlear groove centrally.     3.  Fissuring of irregularity proximal tibial cartilage medially.     4.  Mild sprain of the anterior cruciate, medial collateral and fibular collateral ligaments.     5.  Large joint effusion with fat/fluid level.     6.  Small popliteal cyst.      Assessment/Plan:       1. Knee strain, right, subsequent encounter  - Referral to Physical Therapy    2. Closed fracture of right tibial plateau with routine healing, subsequent encounter  - Referral to Physical Therapy    Released to Restricted Duty FROM 2/9/2024 TO    Office/Sedentary work only. Until cleared by Orthopedics    Keep appointment with orthopedics  Placed referral for physical therapy  Continue in hinged knee brace  Gradually increase walking as tolerated  OTC ibuprofen/Tylenol as needed  Restricted duty  Follow-up as needed      Differential diagnosis, natural history, supportive care, and indications for immediate follow-up discussed.    Approximately 25 minutes was spent in preparing for visit, obtaining history, exam and evaluation, patient counseling/education and post visit documentation/orders.